# Patient Record
Sex: FEMALE | Race: WHITE | NOT HISPANIC OR LATINO | URBAN - METROPOLITAN AREA
[De-identification: names, ages, dates, MRNs, and addresses within clinical notes are randomized per-mention and may not be internally consistent; named-entity substitution may affect disease eponyms.]

---

## 2021-07-29 ENCOUNTER — OFFICE VISIT (OUTPATIENT)
Dept: OBGYN CLINIC | Facility: CLINIC | Age: 56
End: 2021-07-29
Payer: COMMERCIAL

## 2021-07-29 VITALS
DIASTOLIC BLOOD PRESSURE: 75 MMHG | WEIGHT: 208 LBS | HEART RATE: 81 BPM | HEIGHT: 64 IN | BODY MASS INDEX: 35.51 KG/M2 | SYSTOLIC BLOOD PRESSURE: 120 MMHG

## 2021-07-29 DIAGNOSIS — M54.41 CHRONIC BILATERAL LOW BACK PAIN WITH BILATERAL SCIATICA: ICD-10-CM

## 2021-07-29 DIAGNOSIS — G89.29 CHRONIC BILATERAL LOW BACK PAIN WITH BILATERAL SCIATICA: ICD-10-CM

## 2021-07-29 DIAGNOSIS — M54.42 CHRONIC BILATERAL LOW BACK PAIN WITH BILATERAL SCIATICA: ICD-10-CM

## 2021-07-29 DIAGNOSIS — M54.16 LUMBAR RADICULOPATHY: Primary | ICD-10-CM

## 2021-07-29 PROCEDURE — 99204 OFFICE O/P NEW MOD 45 MIN: CPT | Performed by: FAMILY MEDICINE

## 2021-07-29 RX ORDER — HYDROXYCHLOROQUINE SULFATE 200 MG/1
200 TABLET, FILM COATED ORAL 2 TIMES DAILY
COMMUNITY
Start: 2021-07-01

## 2021-07-29 RX ORDER — LISINOPRIL 20 MG/1
20 TABLET ORAL
COMMUNITY

## 2021-07-29 RX ORDER — METOPROLOL SUCCINATE 100 MG/1
200 TABLET, EXTENDED RELEASE ORAL
COMMUNITY
Start: 2021-05-03

## 2021-07-29 RX ORDER — ICOSAPENT ETHYL 1000 MG/1
CAPSULE ORAL
COMMUNITY
Start: 2021-06-16

## 2021-07-29 RX ORDER — AMLODIPINE BESYLATE 5 MG/1
5 TABLET ORAL
COMMUNITY
Start: 2021-04-27

## 2021-07-29 NOTE — PROGRESS NOTES
[facility] [place]  [address]  [facility] [id number] [phone]      Chief Complaint:  Chief Complaint   Patient presents with    Spine - Pain       Vitals:  /75   Pulse 81   Ht 5' 4" (1 626 m)   Wt 94 3 kg (208 lb)   BMI 35 70 kg/m²     The following portions of the patient's history were reviewed and updated as appropriate: allergies, current medications, past family history, past medical history, past social history, past surgical history, and problem list       Subjective:   Patient ID: Uziel Vaughn is a 48 y o  female  Here c/o lower back pain  Hx of Degenerative connective tissue disease= sees Rheum, on plaquenil  XR done- dec disc space  Pain is improving  Pain for almost year  Denies injury/trauma  Went to PT for about 3 months- December to February= helped  Worse with prolonged standing/sweeping/mopping  Pain with walking intermittently  Numbness in B ankle  Pain going down the back of Both legs to the knees  hasnt take steroids  Seeing Chiropractor to lose weight  Ibuprofen- helps, takes occasionally  Dull/sharp pain at times  Denies hx of CA/changes in bowel/bladder      Review of Systems   Constitutional: Negative for fatigue and fever  Respiratory: Negative for shortness of breath  Cardiovascular: Negative for chest pain  Gastrointestinal: Negative for abdominal pain and nausea  Genitourinary: Negative for dysuria  Musculoskeletal: Positive for back pain  Skin: Negative for rash and wound  Neurological: Positive for numbness  Negative for weakness and headaches  Objective:  Back Exam     Tenderness   The patient is experiencing no tenderness  Tests   Straight leg raise right: negative  Straight leg raise left: negative    Comments:  Pain with ext, lateral flexion B            Physical Exam  Vitals and nursing note reviewed  Constitutional:       Appearance: Normal appearance   She is well-developed  HENT:      Head: Normocephalic  Mouth/Throat:      Mouth: Mucous membranes are moist    Eyes:      Extraocular Movements: Extraocular movements intact  Cardiovascular:      Rate and Rhythm: Normal rate and regular rhythm  Heart sounds: Normal heart sounds  Pulmonary:      Effort: Pulmonary effort is normal       Breath sounds: Normal breath sounds  Abdominal:      General: Bowel sounds are normal       Palpations: Abdomen is soft  Musculoskeletal:         General: No tenderness  Normal range of motion  Cervical back: Normal range of motion  Lumbar back: Negative right straight leg raise test and negative left straight leg raise test    Skin:     General: Skin is warm and dry  Neurological:      General: No focal deficit present  Mental Status: She is alert and oriented to person, place, and time  Psychiatric:         Mood and Affect: Mood normal          Behavior: Behavior normal          Thought Content: Thought content normal          I have personally reviewed pertinent films in PACS and my interpretation is XR- Lumbar spine- no fx, mild DJD, L3/4spondylolisthesis  Assessment/Plan:  Assessment/Plan   Diagnoses and all orders for this visit:    Lumbar radiculopathy  -     MRI lumbar spine wo contrast; Future    Chronic bilateral low back pain with bilateral sciatica  -     MRI lumbar spine wo contrast; Future    Other orders  -     lisinopril (ZESTRIL) 20 mg tablet; Take 20 mg by mouth  -     amLODIPine (NORVASC) 5 mg tablet; Take 5 mg by mouth  -     metoprolol succinate (TOPROL-XL) 100 mg 24 hr tablet; Take 200 mg by mouth  -     hydroxychloroquine (PLAQUENIL) 200 mg tablet; Take 200 mg by mouth 2 (two) times a day  -     Icosapent Ethyl (Vascepa) 1 g CAPS; TAKE 1 CAPSULE BY MOUTH EVERY DAY AS DIRECTED        Return for Recheck       Levy Christine MD

## 2021-08-10 ENCOUNTER — TELEPHONE (OUTPATIENT)
Dept: OBGYN CLINIC | Facility: HOSPITAL | Age: 56
End: 2021-08-10

## 2021-08-10 NOTE — TELEPHONE ENCOUNTER
Patient sees Dr Gerhardt Gaines  Patient is calling because she last lost some weight and her back is feeling better  Can she hold off on getting the MRI for now?       CB:  [phone]